# Patient Record
Sex: MALE | Race: WHITE
[De-identification: names, ages, dates, MRNs, and addresses within clinical notes are randomized per-mention and may not be internally consistent; named-entity substitution may affect disease eponyms.]

---

## 2018-05-07 ENCOUNTER — HOSPITAL ENCOUNTER (OUTPATIENT)
Dept: HOSPITAL 80 - FIMAGING | Age: 43
End: 2018-05-07
Attending: ORTHOPAEDIC SURGERY
Payer: COMMERCIAL

## 2018-05-07 DIAGNOSIS — M25.712: Primary | ICD-10-CM

## 2018-05-07 DIAGNOSIS — Z98.890: ICD-10-CM

## 2019-02-14 ENCOUNTER — HOSPITAL ENCOUNTER (EMERGENCY)
Dept: HOSPITAL 80 - FED | Age: 44
Discharge: HOME | End: 2019-02-14
Payer: COMMERCIAL

## 2019-02-14 VITALS — DIASTOLIC BLOOD PRESSURE: 98 MMHG | SYSTOLIC BLOOD PRESSURE: 138 MMHG

## 2019-02-14 DIAGNOSIS — J20.9: Primary | ICD-10-CM

## 2019-02-14 NOTE — EDPHY
H & P


Stated Complaint: difficulty breathing


Time Seen by Provider: 02/14/19 08:02


HPI/ROS: 





CHIEF COMPLAINT:  Lung tightness





HISTORY OF PRESENT ILLNESS:  43-year-old male presents emergency department 

reporting that this morning he woke with anterior chest tightness.  Patient 

thinks this is related to a bronchitis which he was diagnosed with 3 weeks ago.

  At that time the patient had been sick for about a week and a half with 

slight cough, dry, and a bit of an upper respiratory symptoms.  He was seen at 

urgent care and was given prednisone as well as albuterol meter dose inhaler.  

This seemed to improve his symptoms.  2 days ago he developed a coughing fit in 

since that time has had chest tightness when he takes a deep breath.  He has 

not had a fever.  No upper respiratory infection symptoms currently.


He does have a history of reflux but states that that causes pain in his chest 

consistently as opposed to only pain when he takes deep breath.


No fever, chills, palpitations, vomiting, diarrhea, urinary complaints, headache

, lightheadedness.  





REVIEW OF SYSTEMS: 


A comprehensive 10 system review of systems was reviewed and is otherwise 

negative aside from elements mentioned in the history of present illness and 

medical decision making.





PAST MEDICAL HISTORY:  Recent diagnosis of bronchitis.  Patient has been placed 

on albuterol meter dose inhaler.  No personal or family history of coronary 

artery disease or thromboembolic disease.  No recent prolonged travel, surgery, 

or immobilization.





SOCIAL HISTORY:  Nonsmoker, no alcohol.





VITAL SIGNS Reviewed by me.


GENERAL:  Well-developed, well-nourished, resting comfortably in no respiratory 

distress.


HEENT:  Atraumatic.  Eyes:  No icterus, no injection. Mouth:  moist mucous 

membranes.  No erythema or lesions. Neck:  supple with no adenopathy.


LUNGS:  Clear to auscultation bilaterally, no wheezes, rhonchi or rales.


CARDIAC:  Regular rate and rhythm, no rubs, murmurs or gallops.


ABDOMEN:  Soft, nontender, nondistended, bowel sounds normal.


BACK:  No CVA tenderness.


EXTREMITIES:  No trauma. No edema.  Range of motion is normal throughout.


NEURO:  Alert and oriented,  grossly nonfocal.


SKIN:  Warm and dry, no rash.


PSYCHIATRIC:  Normal mentation, no agitation.





- Personal History


Current Tetanus/Diphtheria Vaccine: Yes


Current Tetanus Diphtheria and Acellular Pertussis (TDAP): Yes





- Medical/Surgical History


Hx Asthma: No


Hx Chronic Respiratory Disease: No


Hx Diabetes: No


Hx Cardiac Disease: No


Hx Renal Disease: No


Hx Cirrhosis: No


Hx Alcoholism: No


Hx HIV/AIDS: No


Hx Splenectomy or Spleen Trauma: No


Other PMH: cholecystectomy, shoulder surgery, L tib/fib fx,





- Social History


Smoking Status: Never smoked


Constitutional: 


 Initial Vital Signs











Temperature (C)  36.5 C   02/14/19 07:29


 


Heart Rate  75   02/14/19 07:29


 


Respiratory Rate  16   02/14/19 07:29


 


Blood Pressure  150/87 H  02/14/19 07:29


 


O2 Sat (%)  95   02/14/19 07:29








 











O2 Delivery Mode               Room Air














Allergies/Adverse Reactions: 


 





CATS/DOGS Allergy (Uncoded 02/14/19 07:28)


 


ENVIRONMENTAL Allergy (Uncoded 02/14/19 07:28)


 








Home Medications: 














 Medication  Instructions  Recorded


 


Herbals/Supplements -Info Only 1 ea PO DAILY 06/27/15


 


Multivitamins [Tab-A-Yojana] 1 each PO DAILY 06/27/15


 


Albuterol  02/14/19


 


Albuterol [Proventil Neb] 3 ml IH QID #20 deyvial 02/14/19


 


Atrovent Hfa (*)  02/14/19


 


Azithromycin 250 - 500 mg PO DAILY #6 tablet 02/14/19














Medical Decision Making





- Diagnostics


EKG Interpretation: 





12-LEAD EKG:  Please see the full report in [facility].  My interpretation:  

Normal sinus rhythm no ischemic changes





Xray: [  ] was obtained.  I viewed the images myself on the PACS system.  My 

interpretation of the images is: [  ]. The radiology interpretation is: [  ].  

I discussed the results with the patient.  


Imaging Results: 


 Imaging Impressions





Chest X-Ray  02/14/19 08:58


Impression: Mild airways disease. No pneumonia.


 











Imaging: I viewed and interpreted images myself


ED Course/Re-evaluation: 





43-year-old male presents to the emergency department with ongoing symptoms 

which she thinks are related to bronchitis.  Patient has some chest discomfort 

when he takes a deep breath.





Patient is perc negative.





Chest x-ray shows airways disease but no pneumonia.





EKG demonstrates normal sinus rhythm with no ischemic changes.  Troponin is 

negative.





Patient was discharged with albuterol solution to use in the nebulizer machine 

which his son has, he was given azithromycin for ongoing symptoms of bronchitis

, and was encouraged to follow up with his primary care physician.


Differential Diagnosis: 





Differential diagnosis for the patient's shortness of breath was considered 

including but not limited to pulmonary infectious processes, COPD exacerbation,

  pulmonary emboli, pulmonary edema, congestive heart failure, and cardiac 

causes.





- Data Points


Laboratory Results: 


 











  02/14/19 02/14/19





  09:55 09:11


 


POC Troponin I  0.00 ng/mL ng/mL  





   (0.00-0.08)  


 


Nasal Influenza A PCR    NEGATIVE FOR FLU A 





    (NEGATIVE) 


 


Nasal Influenza B PCR    NEGATIVE FOR FLU B 





    (NEGATIVE) 











Medications Given: 


 








Discontinued Medications





Albuterol (Proventil Neb)  3 ml IH EDNOW ONE


   Stop: 02/14/19 08:59


   Last Admin: 02/14/19 09:02 Dose:  3 ml





Point of Care Test Results: 


 Chemistry











  02/14/19





  09:55


 


POC Troponin I  0.00 ng/mL ng/mL





   (0.00-0.08) 














Departure





- Departure


Disposition: Home, Routine, Self-Care


Clinical Impression: 


Acute bronchitis


Qualifiers:


 Bronchitis organism: unspecified organism Qualified Code(s): J20.9 - Acute 

bronchitis, unspecified





Condition: Good


Instructions:  Acute Bronchitis (ED), How to Use a Nebulizer (ED), Bronchospasm 

(ED)


Additional Instructions: 


Your chest x-ray appears to be consistent with a bronchitis, and has signs of 

airways disease.





I see no evidence of cardiac issue at this time on your EKG.  Your troponin was 

negative.





You been given a prescription for azithromycin.  Please take this as directed.





Please use your albuterol meter dose inhaler 2-4 puffs 3 to 4 times a day for 

the next several days; or you may use the albuterol nebulizer machine as 

directed.





Please follow up with Dr. Dempsey early next week for reexamination.








Referrals: 


Gillian De La Cruz PA [Primary Care Provider] - As per Instructions


Prescriptions: 


Albuterol [Proventil Neb] 3 ml IH QID #20 deyvial


Azithromycin 250 - 500 mg PO DAILY #6 tablet

## 2019-02-14 NOTE — CPEKG
Test Reason : OPEN

Blood Pressure : ***/*** mmHG

Vent. Rate : 074 BPM     Atrial Rate : 076 BPM

   P-R Int : 144 ms          QRS Dur : 087 ms

    QT Int : 386 ms       P-R-T Axes : 028 004 059 degrees

   QTc Int : 429 ms

 

Sinus rhythm

 

Confirmed by Nitza Cortez ([id number]) on 2/14/2019 3:24:20 PM

 

Referred By: Nitza Cortez           Confirmed By:Nitza Cortez

## 2019-02-20 ENCOUNTER — HOSPITAL ENCOUNTER (OUTPATIENT)
Dept: HOSPITAL 80 - FIMAGING | Age: 44
End: 2019-02-20
Attending: PHYSICIAN ASSISTANT
Payer: COMMERCIAL

## 2019-02-20 DIAGNOSIS — R07.89: Primary | ICD-10-CM
